# Patient Record
Sex: MALE | URBAN - METROPOLITAN AREA
[De-identification: names, ages, dates, MRNs, and addresses within clinical notes are randomized per-mention and may not be internally consistent; named-entity substitution may affect disease eponyms.]

---

## 2021-08-06 ENCOUNTER — OFFICE VISIT (OUTPATIENT)
Dept: URBAN - METROPOLITAN AREA CLINIC 75 | Facility: CLINIC | Age: 66
End: 2021-08-06
Payer: COMMERCIAL

## 2021-08-06 DIAGNOSIS — E11.3291 TYPE 2 DIAB W MILD NONPRLF DIABETIC RTNOP W/O MACULAR EDEMA, RIGHT EYE: Primary | ICD-10-CM

## 2021-08-06 DIAGNOSIS — E11.9 DIABETES MELLITUS TYPE 2 WITHOUT MENTION OF COMPLICATION: ICD-10-CM

## 2021-08-06 DIAGNOSIS — D23.10 OTH BENIGN NEOPLASM SKIN/ UNSP EYELID, INCLUDING CANTHUS: ICD-10-CM

## 2021-08-06 PROCEDURE — 99204 OFFICE O/P NEW MOD 45 MIN: CPT | Performed by: OPTOMETRIST

## 2021-08-06 ASSESSMENT — INTRAOCULAR PRESSURE
OS: 22
OD: 13

## 2021-08-06 NOTE — IMPRESSION/PLAN
Impression: Type 2 diab w mild nonprlf diabetic rtnop w/o macular edema, right eye: E11.3291-Optos performed. Mild background diabetic retinopathy, no signs of neovascularization noted. Plan: No treatment necessary at this time. Patient was instructed to monitor vision for sudden changes and to call if visual changes noted. Discussed ocular and systemic benefits of blood sugar control.

## 2021-08-06 NOTE — IMPRESSION/PLAN
Impression: Oth benign neoplasm skin/ unsp eyelid, including canthus: D23.10- RUL- Picture taken for reference. Plan: Will refer pt for lid biopsy.

## 2021-08-25 ENCOUNTER — PROCEDURE (OUTPATIENT)
Dept: URBAN - METROPOLITAN AREA CLINIC 75 | Facility: CLINIC | Age: 66
End: 2021-08-25
Payer: COMMERCIAL

## 2021-08-25 DIAGNOSIS — H02.821 CYSTS OF RIGHT UPPER EYELID: Primary | ICD-10-CM

## 2021-08-25 PROCEDURE — 11440 EXC FACE-MM B9+MARG 0.5 CM/<: CPT | Performed by: OPHTHALMOLOGY

## 2021-08-25 RX ORDER — ERYTHROMYCIN 5 MG/G
OINTMENT OPHTHALMIC
Qty: 3.5 | Refills: 0 | Status: ACTIVE
Start: 2021-08-25

## 2021-08-25 ASSESSMENT — INTRAOCULAR PRESSURE
OD: 17
OS: 18

## 2021-08-25 NOTE — IMPRESSION/PLAN
Impression: Cysts of right upper eyelid: H02.821. Pt reports irritation and that the lesion is impairing his vision. Site cleaned with alcohol and injected with lido w/ epi. Lesion clamped with chalazion clamp and incision made with westcotts. Contents removed with curette with any complications. Pt tolerated the procedure well. Erythromycin applied to site. Plan: Discussed. Recommend removal of cyst. R/B/A discussed with patient. Pt elects to proceed. Pt advised that bruising is common and will resolve on it's own. Pt advised that no suture is needed, incision will heal on it's own. Pt to contact office with any issues or recurrence. Pt to use erythromycin arian TID on the incision site for the next week.

## 2022-09-02 ENCOUNTER — OFFICE VISIT (OUTPATIENT)
Dept: URBAN - METROPOLITAN AREA CLINIC 75 | Facility: CLINIC | Age: 67
End: 2022-09-02
Payer: COMMERCIAL

## 2022-09-02 DIAGNOSIS — E11.9 TYPE 2 DIABETES MELLITUS WITHOUT COMPLICATIONS: Primary | ICD-10-CM

## 2022-09-02 DIAGNOSIS — H52.223 REGULAR ASTIGMATISM, BILATERAL: ICD-10-CM

## 2022-09-02 DIAGNOSIS — H25.813 COMBINED FORMS OF AGE-RELATED CATARACT, BILATERAL: ICD-10-CM

## 2022-09-02 DIAGNOSIS — H43.812 VITREOUS DEGENERATION, LEFT EYE: ICD-10-CM

## 2022-09-02 PROCEDURE — 99214 OFFICE O/P EST MOD 30 MIN: CPT | Performed by: OPTOMETRIST

## 2022-09-02 ASSESSMENT — VISUAL ACUITY
OS: 20/20
OD: 20/25

## 2022-09-02 ASSESSMENT — INTRAOCULAR PRESSURE
OS: 14
OD: 13

## 2022-09-02 NOTE — IMPRESSION/PLAN
Impression: Combined forms of age-related cataract, bilateral: H25.813. Plan: Discussed cataracts do not require treatment unless they interfere with vision and impact one's activities of daily living, in which case cataract extraction with lens implant insertion should be performed. Pt declines any difficulty w/ vision at this time and elects to observe for now. No surgical intervention recommended. Discussed signs and symptoms of cataract progression. Pt to contact office if they experience progressive loss of vision, increasing glare, and problems with activities of daily living such as driving, reading, watching TV, seeing street signs, and following the golf ball.

## 2022-09-02 NOTE — IMPRESSION/PLAN
Impression: Vitreous degeneration, left eye: H43.812. OPTOS ordered and performed revealing no tears or holes at this time. Plan: Also known as posterior vitreous detachments are a normal aging change due to the vitreous jelly pulling away from the retinal lining of the eye. They may accompany retinal tears, retinal holes, or retinal detachments, so a dilated retinal examination is important. PVDs will usually diminish with time, but it may take several months and they rarely disappear entirely. Pt to contact office if symptoms worsen, including an increase in number of floaters, you experience flashing lights, loss of vision, or a black curtain blocking your field of vision.

## 2022-09-02 NOTE — IMPRESSION/PLAN
Impression: Type 2 diabetes mellitus without complications: Y65.1. OPTOS ordered and performed revealing Neg DME Plan: Diabetes type II: no background retinopathy, no signs of neovascularization noted. Discussed ocular and systemic benefits of blood sugar control.

## 2022-09-02 NOTE — IMPRESSION/PLAN
Impression: Regular astigmatism, bilateral: H52.223. Plan: New glasses Rx were distributed today. Patient to call office with any questions or concerns. PRN. 
1yr Full Vision Exam.

## 2024-02-01 ENCOUNTER — OFFICE VISIT (OUTPATIENT)
Dept: URBAN - METROPOLITAN AREA CLINIC 75 | Facility: CLINIC | Age: 69
End: 2024-02-01
Payer: COMMERCIAL

## 2024-02-01 DIAGNOSIS — H25.813 COMBINED FORMS OF AGE-RELATED CATARACT, BILATERAL: ICD-10-CM

## 2024-02-01 DIAGNOSIS — E11.3293 DIABETES MELLITUS TYPE 2 WITH MILD NON-PROLIFERATIVE RETINOPATHY WITHOUT MACULAR EDEMA, BILATERAL: Primary | ICD-10-CM

## 2024-02-01 PROCEDURE — 99214 OFFICE O/P EST MOD 30 MIN: CPT | Performed by: OPTOMETRIST

## 2024-02-01 ASSESSMENT — INTRAOCULAR PRESSURE
OS: 18
OD: 15

## 2025-02-06 ENCOUNTER — OFFICE VISIT (OUTPATIENT)
Dept: URBAN - METROPOLITAN AREA CLINIC 75 | Facility: CLINIC | Age: 70
End: 2025-02-06
Payer: COMMERCIAL

## 2025-02-06 DIAGNOSIS — H52.223 REGULAR ASTIGMATISM, BILATERAL: ICD-10-CM

## 2025-02-06 DIAGNOSIS — H25.813 COMBINED FORMS OF AGE-RELATED CATARACT, BILATERAL: ICD-10-CM

## 2025-02-06 DIAGNOSIS — E11.3293 TYPE 2 DIABETES MELLITUS WITH MILD NONPROLIFERATIVE DIABETIC RETINOPATHY WITHOUT MACULAR EDEMA, BILATERAL: ICD-10-CM

## 2025-02-06 DIAGNOSIS — E11.3291 DIABETES MELLITUS TYPE 2 WITH MILD NON-PROLIFERATIVE RETINOPATHY WITHOUT MACULAR EDEMA, RIGHT EYE: Primary | ICD-10-CM

## 2025-02-06 DIAGNOSIS — H35.033 HYPERTENSIVE RETINOPATHY, BILATERAL: ICD-10-CM

## 2025-02-06 PROCEDURE — 99214 OFFICE O/P EST MOD 30 MIN: CPT | Performed by: OPTOMETRIST

## 2025-02-06 ASSESSMENT — INTRAOCULAR PRESSURE
OS: 22
OD: 22

## 2025-02-06 ASSESSMENT — VISUAL ACUITY
OD: 20/20
OS: 20/20